# Patient Record
Sex: MALE | URBAN - NONMETROPOLITAN AREA
[De-identification: names, ages, dates, MRNs, and addresses within clinical notes are randomized per-mention and may not be internally consistent; named-entity substitution may affect disease eponyms.]

---

## 2018-08-20 ENCOUNTER — TELEPHONE (OUTPATIENT)
Dept: NEUROLOGY | Age: 38
End: 2018-08-20

## 2018-08-20 NOTE — TELEPHONE ENCOUNTER
Received a referral from Dr. Yeung [place] office for this patient. I have called patient to let him know when I have him scheduled an appointment. NO answer. Left a VM regarding the appointment time/date/location/phone #.

## 2018-09-14 ENCOUNTER — TELEPHONE (OUTPATIENT)
Dept: NEUROLOGY | Age: 38
End: 2018-09-14

## 2018-10-08 ENCOUNTER — OFFICE VISIT (OUTPATIENT)
Dept: NEUROLOGY | Age: 38
End: 2018-10-08
Payer: COMMERCIAL

## 2018-10-08 VITALS
SYSTOLIC BLOOD PRESSURE: 117 MMHG | HEART RATE: 67 BPM | HEIGHT: 69 IN | DIASTOLIC BLOOD PRESSURE: 74 MMHG | OXYGEN SATURATION: 97 % | BODY MASS INDEX: 30.96 KG/M2 | WEIGHT: 209 LBS

## 2018-10-08 DIAGNOSIS — R06.81 WITNESSED APNEIC SPELLS: ICD-10-CM

## 2018-10-08 DIAGNOSIS — G47.33 OBSTRUCTIVE SLEEP APNEA: Primary | ICD-10-CM

## 2018-10-08 DIAGNOSIS — R06.83 SNORING: ICD-10-CM

## 2018-10-08 DIAGNOSIS — R40.0 SOMNOLENCE, DAYTIME: ICD-10-CM

## 2018-10-08 PROCEDURE — 99203 OFFICE O/P NEW LOW 30 MIN: CPT | Performed by: PHYSICIAN ASSISTANT

## 2018-10-08 NOTE — PATIENT INSTRUCTIONS
treatment started without another sleep test. While the treatment may seem uncomfortable, noisy, or bulky at first, most people accept the treatment after experiencing better sleep. However, difficulty with mask comfort and nasal congestion prevent up to 50 percent of people from using the treatment on a regular basis. Continued follow up with a healthcare provider helps to ensure that the treatment is effective and comfortable. Information from the CPAP machine is often used by physicians, therapists, and insurers to track the success of treatment. CPAP can be delivered with different features to improve comfort and solve problems that may come up during treatment. Changes in treatment may be needed if symptoms do not improve or if the persons condition changes, such as a gain or loss of weight. Adjust sleep position -- Adjusting sleep position (to stay off the back) may help improve sleep quality in people who have NAVIN when sleeping on the back. However, this is difficult to maintain throughout the night and is rarely an adequate solution. Weight loss -- Weight loss may be helpful for obese or overweight patients. Weight loss may be accomplished with dietary changes, exercise, and/or surgical treatment. However, it can be difficult to maintain weight loss; the five-year success of non-surgical weight loss is only 5 percent, meaning that 95 percent of people regain lost weight. Avoid alcohol and other sedatives -- Alcohol can worsen sleepiness, potentially increasing the risk of accidents or injury. People with NAVIN are often counseled to drink little to no alcohol, even during the daytime. Similarly, people who take anti-anxiety medications or sedatives to sleep should speak with their healthcare provider about the safety of these medications. People with NAVIN must notify all healthcare providers, including surgeons, about their condition and the potential risks of being sedated.  People with NAVIN who are given people. Tracheostomy creates a permanent opening in the neck. It is reserved for people with severe disease in whom less drastic measures have failed or are inappropriate. Although it is always successful in eliminating obstructive sleep apnea, tracheostomy requires significant lifestyle changes and carries some serious risks (eg, infection, bleeding, blockage). All surgical treatments require discussions about the goals of treatment, the expected outcomes, and potential complications. Hypoglossal nerve stimulator- \"Inspire\" device    WHERE TO GET MORE INFORMATION -- Your healthcare provider is the best source of information for questions and concerns related to your medical problem. Organizations  American [employer]nea Association  Provides information about sleep apnea to the public, publishes a newsletter, and serves as an advocate for people with the disorder. [facility], [address]   Cirilo@[email or web address] org   [employer].. org   Tel: [phone]   Fax: [facility] organization that works to [employer] and safety by promoting public understanding of sleep and sleep disorders. Supports sleep-related education, research, and advocacy; produces and distributes educational materials to the public and healthcare professionals; and offers postdoctoral fellowships and grants for sleep researchers. [address]   [email or web address]   [email or web address].[email or web address]. org   Tel: [phone]   Fax: [phone]    Important information:  Medicare/private insurance CPAP/BiPAP/APAP requirements:  Medicare/private insurance has specific requirements for PAP compliance that must be met during the first 90 days of use to continue coverage for CPAP/BiPAP/APAP  from day 91 and beyond.  The policy requires that patients use a PAP device 4 hours per 24 hour period, at least 70% of side or stomach sleepers find that the head sinks in to their bed pillow which causes the blockage of mask exhalation ports and risk of dangerous CO2 (carbon dioxide) rebreathing. Others find their pillow causes unwanted mask frame movement (pushed off center) causing mask leak, pressure point soreness or bruised cheek bones. To resolve, purchase PAPillow, a specially made bed pillow designed for CPAP users and for any side or stomach sleepers. Mask Problems  Skin irritation, sores and bruises from mask  No mask should cause pain nor discomfort if sized correctly and fitted properly. If irritation or sores are developing, chances are you are over tightening your mask. Your mask cushion may be worn out and need replacing. Mask liners called RemZzzs might also resolve skin irritation and mask leak. Or try \"mole skin\" under the mask. I like my mask choice, but still get occasional facial sores or leaking. Your cpap supplier should offer products that might be helpful if you otherwise like your mask. One is the Gecko nasal pad. It is an easy to use gel pad that is placed across the nose bridge and under the mask. It helps with leak in the upper part of the mask frame and is beneficial to patients who have a narrow nose bridge or who are prone to cuts or irritation to the nose bridge. You may also want to try MaskMate, a CPAP Mask Sealer and Lubricant. Another product patients find helpful are SoreSpot CPAP Skin Protectors. They are a unique liquid-filled bandage that minimizes friction and helps prevent skin irritation. Check out RemZzzs full face and nasal mask liners. They are applied directly to the silicone mask cushion to help absorb facial moisture and oils, and prevent skin irritation and pressure marks as well as to reduce noisy mask leak.     I wake up without my mask on, but have no memory of removing it during sleep  This is a common occurrence, especially during the early adjustment period of CPAP a sewing needle or toothpick to keep the holes free flowing. My bed partner is bothered by the air flow of the exhalation ports from my mask  All masks have exhalation ports to allow the escape of CO2 (carbon dioxide). The higher the machine pressure setting, the harsher this escape flow will be. Some masks have better air diffusion features than others. Some patients resolve by side sleeping with their backs turned to their bed partner to avoid a distracting air flow. Machine Problems  My CPAP machine is not working  Make sure the electric plugs to the wall and to the machine are firmly in place. Check your electric outlet to make sure it is working. Otherwise, make an appointment to bring your machine in to be evaluated by your cpap supplier. My CPAP machine is too noisy; it keeps me / my bed partner from sleeping. Bladimir Crisp are nearly silent, so unless you are hypersensitive to noise ( try ear plugs), this would indicate a problem. Check the machine filters. They should be changed monthly or when visibly discolored to help keep the machines running smoothly. There will be a slight audible noise if you are using a Bi Level machine as the pressure transitions between inhalation and exhalation settings. There will be a slight audible noise if you use an AutoCPAP as the machine changes inhalation pressure. If your machine is otherwise noisy, there may be a machine defect. Make an appointment to bring your machine in to be evaluated by your cpap supplier. I get tangled in my CPAP tubing during the night  Try placing the tubing behind your head near the top of your pillow, or positioned behind the headboard bed post. Most cpap suppliers offer an inexpensive Tubing Lift to help with tube positioning for better sleep. Easy to use, the small frame is held in place between the box spring and mattress. The lift holds the tubing above the head allowing for better freedom of movement.     I keep pulling my CPAP machine off the night stand. The length of standard CPAP tubing is about 6 feet. Active sleepers who toss and turn are more apt to tug on the tubing and pull their machine off the bedside table. Most cpap suppliers offer tubing in 10 foot lengths which give patients more freedom of movement especially when coupled with a Tubing Lift. Humidifier Problems  CPAP is causing dry mouth, dry throat, runny nose, stuffy nose, sneezing  A CPAP humidifier or temperature adjustment may usually resolve all of the above issues. Start with the lowest heat setting and turn up as needed for more moisture. Biotene spray or oral rinse products may help with dry mouth. Chronic nasal lining dryness may be helped with Ocean or other simple saline spray solutions. Also, try nasal saline gel, called Ayr. Swab into both nostrils right before you put the mask on. A nasal steroid spray can help with congestion, as well. These are available over the counter at your pharmacy. Water in CPAP tubing  Excess condensation can form in the CPAP tubing when the temperature of your bedroom is cooler than the air coming from your machine. Most cpap suppliers offer inexpensive, insulating hose covers \"tube buddy\" or a heated tube to resolve this common problem    White or pink film in humidifier water chamber  Bacteria can quickly develop in the water chamber. All manufacturers recommend the use of distilled water. Tap water may be used on occasion. Each morning, empty any leftover water, rinse chamber and let air dry. To remove film, fill chamber with 1/3 white distilled vinegar to 2/3 tap water solution. Let soak for one hour. Rinse with clear tap water, air dry. Water spill  Always remove water chamber unit from machine before filling with distilled water. Spilling water in to the machine may compromise the interior circuits, damage the machine and void the warranty.     Mask Fit and Adjustment Tips  Mask leak is one of the most common challenges for patients. To assure the best fit and adjustment, when retiring to sleep, sit on your bed and place mask on the head/face with straps loose. With machine turned on and air blowing, lay down with your head on the pillow in your normal sleeping position. Slowly tighten your mask straps just until you get a good seal, being careful not to needlessly over tighten. The final step is to seat your mask. After straps are adjusted, pull the mask out and away from your face (about 2 inches) and gently lay back on face. This allows the dual mask cushions to inflate which will assure the best seal possible and comfortable fit. Mask fit varies with sleeping position, so if you fit for side or stomach sleeping, you will need to readjust if you roll to your back. This is why many patients train themselves to sleep solely on side or stomach (same mask fit) versus back sleeping which has a different mask fit. Mask fit tips  Full Face - Mask users with forehead pads/brace: tighten upper strap first, then follow with lower strap positioning and fit. Mask Headgear - Masks come with a one size fits most head gear. Larger and smaller strapped headgear may be available by special order. Nasal Pillow Mask - Place mask on face and position headgear and place side straps above the ears. Gently slip nasal pillows in to the nostrils making sure to rotate the angle of the pillow barrel for a comfortable fit. The pillows are meant to lie just inside the nostril opening, not to be aggressively inserted. Proper placement should not cause the tip of the nose to be raised.

## 2018-11-28 ENCOUNTER — HOSPITAL ENCOUNTER (OUTPATIENT)
Dept: SLEEP CENTER | Age: 38
Discharge: HOME | End: 2018-11-30
Payer: COMMERCIAL

## 2018-11-28 PROCEDURE — G0399 HOME SLEEP TEST/TYPE 3 PORTA: HCPCS

## 2018-11-28 PROCEDURE — 95806 SLEEP STUDY UNATT&RESP EFFT: CPT | Performed by: PSYCHIATRY & NEUROLOGY

## 2018-12-02 DIAGNOSIS — G47.33 OBSTRUCTIVE SLEEP APNEA: Primary | ICD-10-CM

## 2019-01-14 ENCOUNTER — TELEPHONE (OUTPATIENT)
Dept: NEUROLOGY | Age: 39
End: 2019-01-14